# Patient Record
Sex: FEMALE | URBAN - METROPOLITAN AREA
[De-identification: names, ages, dates, MRNs, and addresses within clinical notes are randomized per-mention and may not be internally consistent; named-entity substitution may affect disease eponyms.]

---

## 2018-04-17 ENCOUNTER — TELEPHONE (OUTPATIENT)
Dept: OTHER | Facility: CLINIC | Age: 58
End: 2018-04-17

## 2018-04-17 NOTE — TELEPHONE ENCOUNTER
4/17/2018    Call Regarding Onboarding [employer] OTHER     Attempt 1    Message on voicemail     Comments:       Outreach   SB

## 2018-05-10 NOTE — TELEPHONE ENCOUNTER
5/10/2018    Call Regarding Onboarding [employer] OTHER     Attempt 2    Message on voicemail     Comments:       Outreach   SB

## 2018-06-05 NOTE — TELEPHONE ENCOUNTER
6/5/2018    Call Regarding Onboarding [employer] OTHER    Attempt 3    Message on voicemail     Comments:       Outreach   QIAN